# Patient Record
Sex: FEMALE | Race: WHITE | HISPANIC OR LATINO | Employment: FULL TIME | URBAN - METROPOLITAN AREA
[De-identification: names, ages, dates, MRNs, and addresses within clinical notes are randomized per-mention and may not be internally consistent; named-entity substitution may affect disease eponyms.]

---

## 2024-03-15 ENCOUNTER — WALK IN (OUTPATIENT)
Dept: URGENT CARE | Age: 34
End: 2024-03-15

## 2024-03-15 VITALS
DIASTOLIC BLOOD PRESSURE: 72 MMHG | TEMPERATURE: 98.9 F | OXYGEN SATURATION: 97 % | HEART RATE: 74 BPM | SYSTOLIC BLOOD PRESSURE: 111 MMHG | RESPIRATION RATE: 18 BRPM

## 2024-03-15 DIAGNOSIS — R09.82 PND (POST-NASAL DRIP): Primary | ICD-10-CM

## 2024-03-15 DIAGNOSIS — J02.9 SORE THROAT: ICD-10-CM

## 2024-03-15 LAB
S PYO DNA THROAT QL NAA+PROBE: NOT DETECTED
TEST LOT EXPIRATION DATE: NORMAL
TEST LOT NUMBER: NORMAL

## 2024-03-15 ASSESSMENT — ENCOUNTER SYMPTOMS
RHINORRHEA: 1
CONSTITUTIONAL NEGATIVE: 1
SORE THROAT: 1
NAUSEA: 1
PSYCHIATRIC NEGATIVE: 1
RESPIRATORY NEGATIVE: 1

## 2024-06-10 ENCOUNTER — APPOINTMENT (OUTPATIENT)
Dept: FAMILY MEDICINE | Age: 34
End: 2024-06-10

## 2024-07-11 ENCOUNTER — TELEPHONE (OUTPATIENT)
Dept: INTERNAL MEDICINE CLINIC | Facility: CLINIC | Age: 34
End: 2024-07-11

## 2024-07-11 ENCOUNTER — OFFICE VISIT (OUTPATIENT)
Dept: INTERNAL MEDICINE CLINIC | Facility: CLINIC | Age: 34
End: 2024-07-11
Payer: COMMERCIAL

## 2024-07-11 VITALS
HEIGHT: 62 IN | WEIGHT: 247.19 LBS | OXYGEN SATURATION: 98 % | SYSTOLIC BLOOD PRESSURE: 110 MMHG | BODY MASS INDEX: 45.49 KG/M2 | DIASTOLIC BLOOD PRESSURE: 76 MMHG | HEART RATE: 80 BPM

## 2024-07-11 DIAGNOSIS — E25.0 CONGENITAL ADRENAL CORTICAL HYPERPLASIA (HCC): ICD-10-CM

## 2024-07-11 DIAGNOSIS — E66.01 MORBID OBESITY (HCC): ICD-10-CM

## 2024-07-11 DIAGNOSIS — R63.5 ABNORMAL WEIGHT GAIN: ICD-10-CM

## 2024-07-11 DIAGNOSIS — E03.9 HYPOTHYROIDISM, UNSPECIFIED TYPE: Primary | ICD-10-CM

## 2024-07-11 PROBLEM — O20.9 VAGINAL BLEEDING IN PREGNANCY, FIRST TRIMESTER (HCC): Status: ACTIVE | Noted: 2022-08-31

## 2024-07-11 PROBLEM — M25.561 CHRONIC PAIN OF RIGHT KNEE: Status: ACTIVE | Noted: 2021-11-06

## 2024-07-11 PROBLEM — R91.8 MASS OF LEFT LUNG: Status: ACTIVE | Noted: 2021-04-28

## 2024-07-11 PROBLEM — O13.3 GESTATIONAL HYPERTENSION, THIRD TRIMESTER (HCC): Status: ACTIVE | Noted: 2023-12-31

## 2024-07-11 PROBLEM — O09.01 PREGNANCY WITH HISTORY OF INFERTILITY IN FIRST TRIMESTER (HCC): Status: ACTIVE | Noted: 2022-08-30

## 2024-07-11 PROBLEM — O30.049 DICHORIONIC DIAMNIOTIC TWIN PREGNANCY (HCC): Status: ACTIVE | Noted: 2022-08-31

## 2024-07-11 PROBLEM — G89.29 CHRONIC PAIN OF RIGHT KNEE: Status: ACTIVE | Noted: 2021-11-06

## 2024-07-11 PROCEDURE — 99204 OFFICE O/P NEW MOD 45 MIN: CPT | Performed by: INTERNAL MEDICINE

## 2024-07-11 RX ORDER — LEVOTHYROXINE SODIUM 112 UG/1
TABLET ORAL
COMMUNITY
Start: 2024-07-08

## 2024-07-11 RX ORDER — LEVOTHYROXINE SODIUM 88 UG/1
88 TABLET ORAL DAILY
COMMUNITY

## 2024-07-11 RX ORDER — TIRZEPATIDE 5 MG/.5ML
5 INJECTION, SOLUTION SUBCUTANEOUS WEEKLY
Qty: 2 ML | Refills: 0 | Status: SHIPPED | OUTPATIENT
Start: 2024-07-11 | End: 2024-08-02

## 2024-07-11 RX ORDER — TIRZEPATIDE 2.5 MG/.5ML
2.5 INJECTION, SOLUTION SUBCUTANEOUS WEEKLY
Qty: 2 ML | Refills: 0 | Status: SHIPPED | OUTPATIENT
Start: 2024-07-11 | End: 2024-08-02

## 2024-07-11 NOTE — PROGRESS NOTES
Honey Astorga is a 33 year old female.  Chief complaint:  weight loss management and obesity related comorbidities    HPI:     Honey Astorga is a 33 year old female new to our office today.  with PMH as listed below here for weight management     Weight history:    Wt Readings from Last 12 Encounters:   07/11/24 247 lb 3.2 oz (112.1 kg)   After she gave birth to her child started gaining weight   Has been up and down but not as much     Weight:  Max weight:247 lbs   Lowest weight:125 lbs     Triggers for weight gain? Food choices       Previous weight loss programs? YES:  Weight Watchers      Previous weight loss medications? No   Do you get up to eat at night? No    Typical diet   Breakfast: Lunch: Dinner: Snacks:   Coffee with almond milk   Yogurt or sausage egg with muffin  Sometimes skips lunch   Frozen pizza   Sandwiches  [place] and rice   Has a lot of rice with her dinner   Broccoli with rice   Tostada    Doesn't snack as much   Lightly salted ships       Sweet tooth: No  Soda or Juice: cut out soda   Has been a month     Was drinking regular soda   Before was 2 soda per day   Activity: yes orange theory 2ce per week   Sleep: 6-7 hours of sleep     Stress: 7/10-     Significant PMH/ or weight Related Co morbidities :   History of thyroid disease: Yes  History of thyroid nodule: No  Family history of thyroid cancer: No  History of pancreatitis : No  History of kidney stone: No   History of glaucoma: No  History of heart disease: No  Seizure: No          Knee is hurting   Tride knee brace   Limiting her exercise         Hypothyroidism   On levothyroxine       Current Outpatient Medications   Medication Sig Dispense Refill    levothyroxine 112 MCG Oral Tab       levothyroxine 88 MCG Oral Tab Take 1 tablet (88 mcg total) by mouth daily.         Past Medical History:    Congenital adrenal hyperplasia due to 21-hydroxylase deficiency (21-OH CAH), late onset (HCC)    Hypothyroidism     History reviewed. No  pertinent surgical history.    Patient Active Problem List   Diagnosis    Chronic pain of right knee    Congenital adrenal cortical hyperplasia (HCC)    Dichorionic diamniotic twin pregnancy (HCC)    Gestational hypertension, third trimester (HCC)    Mass of left lung     (normal spontaneous vaginal delivery) (HCC)    Pregnancy with history of infertility in first trimester (HCC)    Vaginal bleeding in pregnancy, first trimester (HCC)               REVIEW OF SYSTEMS:   A comprehensive 10 point review of systems was completed.  Pertinent positives and negatives noted in the HPI      PHYSICAL EXAM:   /76   Pulse 80   Ht 5' 2\" (1.575 m)   Wt 247 lb 3.2 oz (112.1 kg)   SpO2 98%   BMI 45.21 kg/m²   [unfilled]    Wt Readings from Last 6 Encounters:   24 247 lb 3.2 oz (112.1 kg)        GENERAL: well developed, well nourished,in no apparent distress  NECK: enlarged thyroid   LUNGS: clear to auscultation  CARDIO: RRR without murmur  NEURO: no gross deficits           Medications    levothyroxine 112 MCG Oral Tab    levothyroxine 88 MCG Oral Tab     No orders of the defined types were placed in this encounter.      ASSESSMENT/PLAN:       ICD-10-CM    1. Hypothyroidism, unspecified type  E03.9 levothyroxine 112 MCG Oral Tab     levothyroxine 88 MCG Oral Tab     Hemoglobin A1C (Glycohemoglobin) [E]     Lipid Panel [E]     TSH W Reflex To Free T4      2. Morbid obesity (HCC)  E66.01 levothyroxine 112 MCG Oral Tab     levothyroxine 88 MCG Oral Tab     Hemoglobin A1C (Glycohemoglobin) [E]     Lipid Panel [E]     TSH W Reflex To Free T4     CANCELED: Lipid Panel [E]     CANCELED: Hemoglobin A1C (Glycohemoglobin) [E]     CANCELED: TSH W Reflex To Free T4      3. Abnormal weight gain  R63.5 levothyroxine 112 MCG Oral Tab     levothyroxine 88 MCG Oral Tab     Hemoglobin A1C (Glycohemoglobin) [E]     Lipid Panel [E]     TSH W Reflex To Free T4     CANCELED: Lipid Panel [E]     CANCELED: Hemoglobin A1C  (Glycohemoglobin) [E]     CANCELED: TSH W Reflex To Free T4      4. Congenital adrenal cortical hyperplasia (HCC)  E25.0 levothyroxine 112 MCG Oral Tab     levothyroxine 88 MCG Oral Tab     Hemoglobin A1C (Glycohemoglobin) [E]     Lipid Panel [E]     TSH W Reflex To Free T4           Reviewed  Readiness for Lifestyle change: 10/10, Interest in Medication: 8/10, Surgery interest: 0/10.    Counseled on comprehensive weight loss plan including attention to nutrition, exercise and behavior/stress management for success.     Plan:  Advised the patient to follow low carb high protein diet   Advised to count carbs goal carbs is 50 g per day   Advised to increase protein goal is 90 g per day   Can add protein shakes   Increase water intake goal is 64 oz per day   Start probiotics   Increase exercise cardio and weight resistance training, discussed the importance of exercise for weight loss goal is 150 min per week   don't eat at late night   Given meal plan   Discussed resources for low carb meal   Discussed the importance of sleep and reducing stress for weight loss  Given low carb meal plan   Hba1c , lipid panel and tsh level   Reviewed and discussed blood work ordered by her pcp   Enlarged thyroid recommended us thyroid : she has an appt with endo and will discuss it with them at that time   Continue levothyroxine recheck thyroid level   Advised to check with insurance company if they cover wegovy       Discussed:  -low carb high protein  -Limit carbohydrates  -Read nutrition labels and keep a food log  -drink a lot of water 65 oz of water per day  - Do not drink your calories (no regular pop, juice, high calorie coffee drinks, limit alcohol)  - Do not eat late at night  - Exercise- at least 3 times per week ( goal is 150 min/week)  -dietician referral: No  -Labs as ordered: Yes        Follow up with PCP as scheduled.   Follow up with Alycia Valencia 1 mos    Please return to the clinic if you are having persistent or  worsening symptoms   Alycia Valencai MD,   Diplomate of the American Board of Internal Medicine  Diplomate of the American Board of Obesity Medicine

## 2024-07-11 NOTE — TELEPHONE ENCOUNTER
PRIOR AUTHORIZATION [employer]    Medication Name:   Tirzepatide-Weight Management (ZEPBOUND) 2.5 MG/0.5ML Subcutaneous Solution Auto-injector 2 mL 0 7/11/2024 8/2/2024   Sig:   Inject 2.5 mg into the skin once a week for 4 doses.     Route:   Subcutaneous     Order #:   [id number]           Indication per provider:    Morbid obesity (HCC) E66.01

## 2024-07-11 NOTE — PATIENT INSTRUCTIONS
CALVIN Amaro to [facility]. We are excited that you are committed to improving your health and have invited our practice to be part of your journey. Our approach to the medical management of weight loss is similar to that of other chronic diseases, like asthma or high blood pressure. Treatment is tailored to your needs and may look different than someone else in our program. Weight-loss success is dependent on many factors, including your motivation and commitment to better health.      We are committed to your medical safety, particularly when prescribing weight-loss medications. Because we are physicians, we measure your success not only by “pounds lost” - we will also track improvements in your laboratory work, vital signs and quality of life.      Weight loss and maintenance can be a challenging endeavor. We want to celebrate your successes and support you if you encounter difficult times. Please don’t hesitate to ask us questions and share with us any struggles you may have. For some patients, there may be many attempts at weight loss before lasting success is found, but we can help you find your success!      Sincerely,     Alycia Valencia MD  American Board of Obesity Medicine Diplomate  American Board of Internal Medicine Diplomate                                                                  Weight Loss Tips    Cut down on sugar and starches.    Ok to eat healthy fat ( avocado, nuts,eggs, olive oil and coconut oil)    Eat protein with each meal target 15-30 G of protein with each meal: Protein reduces appetite, cravings and hunger. It increases muscle mass and subsequently metabolism and fat burning.     If not able to meet your protein need, you can get a protein shake. Choose one with around 25 g of protein and low carb less than 5 g ( e.g: premier protein, orgain Clean Protein, whey protein muscle milk)    Limit carbohydrates between 50g-100g / day    Limit processed food, eat  unprocessed food whenever you can.    Read nutrition labels    Drink a lot of water  at least 65 oz of water per day: Water is a natural appetite suppressant, increases calorie burning and help you to loose weight.    Eat slowly.    Do not drink your calories ( avoid soft drinks, juice, high calorie coffee drinks, limit alcohol) Also stay away from artificially sweetened beverages too it can cause weight gain.    Think about challenges and write it down to address it next visit.     Do not eat late at night.      Exercise goal for weight loss is 150 minutes per week.    Take a probiotic everyday ( e.g: anguiano colon health brand, culturelle, VSL3, Lactobacillus Gasseri )     Use smart phone applications to track your progress/ carb intake e.g:( my fitnesspal, loose it, Carb Manager )    Have a good night sleep aim for 7-8 hours    Reduce your stress level.    Helpful websites: www.dietHunch.Fieldoo( search visual low-carb guides dietdoctor), or www.PollGround.Fieldoo.    Helpful exercise apps( [employer] zheng)   Helpful fasting apps :( Zero)         Foods to avoid :  Sugar: sweets, ice cream, fruit juices, candy and food that is high in added sugar.  Highly processed food  Refined grains: Rice, wheat, bread, cereal and pasta.  Starchy vegetables: Potatoes and corn     Low Carb food :  Meat: lamb, steak, chicken, turkey  Fish and sea food   Eggs  Plain yogurt   Cheese   Fat and oils   Fruits: berries are the best           How I Plan to Lose Weight      Goal setting is the “how” of weight loss. Motivators are the “why.” When setting goals, utilize the SMART technique:    SMART Technique Example   Specific Who, what, where, when, how… “I want to lose 10 pounds in two months.”   Measureable How will you track? 10 pounds in 8 weeks = 1.25 pounds/week   Attainable Resources you have available, previous experience “I have been able to do this before, and now I have new tools from my doctor!”   Relevant Why this goal is important  Review your motivators above   Timely Set benchmarks and deadlines “Focusing for two month intervals works for me.”         Even if your lose 0.5 pound per week You will still lose 26                       pounds by this time next year!

## 2024-07-12 LAB
CHOL/HDLC RATIO: 4.3 (CALC)
CHOLESTEROL, TOTAL: 164 MG/DL
HDL CHOLESTEROL: 38 MG/DL
HEMOGLOBIN A1C: 5.6 % OF TOTAL HGB
LDL-CHOLESTEROL: 101 MG/DL (CALC)
NON-HDL CHOLESTEROL: 126 MG/DL (CALC)
TRIGLYCERIDES: 145 MG/DL
TSH W/REFLEX TO FT4: 3.48 MIU/L

## 2024-07-23 ENCOUNTER — MED REC SCAN ONLY (OUTPATIENT)
Dept: INTERNAL MEDICINE CLINIC | Facility: CLINIC | Age: 34
End: 2024-07-23

## 2024-08-26 ENCOUNTER — PATIENT MESSAGE (OUTPATIENT)
Dept: INTERNAL MEDICINE CLINIC | Facility: CLINIC | Age: 34
End: 2024-08-26

## 2024-08-26 RX ORDER — TIRZEPATIDE 5 MG/.5ML
5 INJECTION, SOLUTION SUBCUTANEOUS WEEKLY
Qty: 6 ML | Refills: 0 | Status: SHIPPED | OUTPATIENT
Start: 2024-08-26

## 2024-08-27 NOTE — TELEPHONE ENCOUNTER
Reshma Mayfield, JUANITO 8/26/2024 12:13 PM CDT      ----- Message -----  From: Honey Astorga  Sent: 8/26/2024 12:11 PM CDT  To: [facility] Clinical Staff  Subject: Refill request     Hello!    I am on my last 5mg shot for zepbound. Looks like I will need a refill before my next appointment which is scheduled for September 28th.    Thank you,  Honey Astorga

## 2024-09-28 ENCOUNTER — TELEMEDICINE (OUTPATIENT)
Dept: INTERNAL MEDICINE CLINIC | Facility: CLINIC | Age: 34
End: 2024-09-28
Payer: COMMERCIAL

## 2024-09-28 DIAGNOSIS — E66.01 MORBID OBESITY (HCC): ICD-10-CM

## 2024-09-28 DIAGNOSIS — Z51.81 THERAPEUTIC DRUG MONITORING: Primary | ICD-10-CM

## 2024-09-28 DIAGNOSIS — E03.9 HYPOTHYROIDISM, UNSPECIFIED TYPE: ICD-10-CM

## 2024-09-28 PROBLEM — E28.2 PCOS (POLYCYSTIC OVARIAN SYNDROME): Status: ACTIVE | Noted: 2024-09-28

## 2024-09-28 PROBLEM — N93.9 ABNORMAL UTERINE BLEEDING: Status: ACTIVE | Noted: 2024-09-28

## 2024-09-28 PROCEDURE — 99214 OFFICE O/P EST MOD 30 MIN: CPT | Performed by: INTERNAL MEDICINE

## 2024-09-28 RX ORDER — TIRZEPATIDE 5 MG/.5ML
5 INJECTION, SOLUTION SUBCUTANEOUS WEEKLY
Qty: 6 ML | Refills: 0 | Status: SHIPPED | OUTPATIENT
Start: 2024-09-28 | End: 2024-12-15

## 2024-09-28 RX ORDER — LEVONORGESTREL AND ETHINYL ESTRADIOL 0.1-0.02MG
1 KIT ORAL DAILY
COMMUNITY

## 2024-09-28 RX ORDER — SWAB
1 SWAB, NON-MEDICATED MISCELLANEOUS DAILY
COMMUNITY

## 2024-09-28 NOTE — PATIENT INSTRUCTIONS
Build Muscle & Lose Fat  The great fat vs muscle diagram below paints a clear picture of why it’s so important for you to build muscle in order to lose fat.  Maybe you’ve wondered about muscle vs fat and why you need to build muscle to lose fat, look slim and keep the inches off.  Well, look no further! With the fat vs muscle diagram below you’ll see why healthy permanent weight loss requires you to build muscle to lose fat.  Fat vs Muscle Diagram  The facts are clear. The best way to lose fat and look slimmer is to build muscle. Since one picture’s worth a thousand words, here’s 5 lbs of muscle vs fat of the same weight. Notice 5 lbs of fat is three times bigger.    This means that if you were to build 5 lbs of muscle and lose 5 lbs of fat, you would weigh exactly the same, but look smaller and firmer.  So imagine if it were 25 lbs or 50 lbs of lost fat vs muscle gained.  This is why it’s possible for you to lose fat inches when exercising, yet show no change in scale weight. And can you see how firm the muscle looks compared to the lumpy, tapioca pudding consistency of fat?  Build Muscle to Lose Fat Benefits  Although daily physical activity, like walking, swimming and aerobics are essential to good heart health and weight management, combining muscle building weight training with cardiovascular exercise, gives you an unbeatable combination to lose fat and keep it off permanently.  Of course it takes a few weeks before you see any measurable changes. But you’ll start to build muscle, lose fat and burn more calories from the moment you begin weight training. Building muscle helps you:  1. Burn more calories. Unlike fat, muscle beefs up your metabolism to help you burn about 70% more calories than fat can.  2. Improve appearance. When done properly, strength training can greatly improve your posture and help to prevent joint pain.  3. Build confidence. Strong muscles and joints increase your level of confidence in  your abilities to perform many lifestyle activities.  4. Prevent injury. Strength training can build stronger muscles and more limber, flexible joints, which play a crucial role in preventing injury.  5. Increase bone density. Weight bearing activities improve your bone density and reduce bone loss. This helps to prevent osteoporosis.  Studies show that weight training combined with aerobics and stretching is the best way to build a strong, firm body and keep it that way.  So, if you want to look and feel better than ever, you need to build muscle to lose fat.     Taken from www.NsGene.Bettery by Henry Varela

## 2024-09-28 NOTE — PROGRESS NOTES
This visit was conducted using telemedicine with live interactive video and audio   Patient verbally consents to conduct video visit   Patient understands and accepts financial responsibility for any deductible, co-insurance and/or co-pays associated with this service.        Honey Astorga is a 33 year old female presents today for   CC: follow-up on medical weight loss program for therapeutic drug monitoring and obesity         HPI:   HONEY here for follow up on weight loss   Wt Readings from Last 12 Encounters:   07/11/24 247 lb 3.2 oz (112.1 kg)   Current weight 229 lbs   Starting weight: 247 lbs   Total weight loss: 18 lbs   Medication: zepbound 5 mg     Typical diet   Breakfast: Lunch: Dinner: Snacks:   Greek yogurt with the protein    Whatever is around   Salad    Whatever is around   Tosadas   Pasta   Protein pasta   Steak   Broccoli   Chicken        Doesn't snack as much   Nuts      She does have a protein shake instead of the meal     She was logging in the food     When she was tracking before   She was doing 70 g carb per day   Protein around 70 g protein     Soda/ juice/ alcohol: once in a while   When she goes out       Water intake: inadequate  Exercise: Yes: 2-3 times per week   Challenges: meal planning   She doesn't get enough protein     Side effect of medication: nausea on and off       Current Outpatient Medications   Medication Sig Dispense Refill    LUTERA 0.1-20 MG-MCG Oral Tab Take 1 tablet by mouth daily.      Prenatal 28-0.8 MG Oral Tab Take 1 tablet by mouth daily.      Tirzepatide-Weight Management (ZEPBOUND) 5 MG/0.5ML Subcutaneous Solution Auto-injector Inject 5 mg into the skin once a week. 6 mL 0    levothyroxine 112 MCG Oral Tab       levothyroxine 88 MCG Oral Tab Take 1 tablet (88 mcg total) by mouth daily.        Past Medical History:    Congenital adrenal hyperplasia due to 21-hydroxylase deficiency (21-OH CAH), late onset (HCC)    Hypothyroidism     No past surgical history on  file.     Social History:  Social History     Socioeconomic History    Marital status:    Tobacco Use    Smoking status: Never    Smokeless tobacco: Never   Substance and Sexual Activity    Alcohol use: Yes     Comment: OCC     Social Determinants of Health     Financial Resource Strain: Low Risk  (2023)    Received from [facility]    Overall Financial Resource Strain (CARDIA)     Difficulty of Paying Living Expenses: Not hard at all   Food Insecurity: No Food Insecurity (2023)    Received from [facility]    Hunger Vital Sign     Worried About Running Out of Food in the Last Year: Never true     Ran Out of Food in the Last Year: Never true   Transportation Needs: No Transportation Needs (2023)    Received from [facility]    PRAPARE - Transportation     Lack of Transportation (Medical): No     Lack of Transportation (Non-Medical): No   Housing Stability: Low Risk  (2023)    Received from [facility]    Housing Stability Vital Sign     Unable to Pay for Housing in the Last Year: No     Number of Places Lived in the Last Year: 1     Unstable Housing in the Last Year: No        Family History   Problem Relation Age of Onset    Heart Disorder Father     Diabetes Father     Lipids Mother      Patient Active Problem List   Diagnosis    Chronic pain of right knee    Congenital adrenal cortical hyperplasia (HCC)    Dichorionic diamniotic twin pregnancy (HCC)    Gestational hypertension, third trimester (HCC)    Mass of left lung     (normal spontaneous vaginal delivery) (HCC)    Pregnancy with history of infertility in first trimester (HCC)    Vaginal bleeding in pregnancy, first trimester (HCC)    Abnormal weight gain    Morbid obesity (HCC)    Hypothyroidism    PCOS (polycystic ovarian syndrome)    Abnormal uterine bleeding               REVIEW OF SYSTEMS:   A comprehensive 10 point review of  systems was completed.  Pertinent positives and negatives noted in the the HPI          PHYSICAL EXAM:       General: She is not in acute distress, normal appearance, not ill appearing  EYE: normal sclera, EOMI   Pulmonary/ chest:  Speaking in full sentences, no increased work of breathing  Psychiatric: Behavior is normal, normal affect  Skin: No visible lesions  Extremities: no obvious extremity swelling noted         Orders Placed This Encounter    LUTERA 0.1-20 MG-MCG Oral Tab     Sig: Take 1 tablet by mouth daily.    Prenatal 28-0.8 MG Oral Tab     Sig: Take 1 tablet by mouth daily.         ASSESSMENT/PLAN:       ICD-10-CM    1. Therapeutic drug monitoring  Z51.81 LUTERA 0.1-20 MG-MCG Oral Tab     Prenatal 28-0.8 MG Oral Tab     Tirzepatide-Weight Management (ZEPBOUND) 5 MG/0.5ML Subcutaneous Solution Auto-injector      2. Morbid obesity (HCC)  E66.01 LUTERA 0.1-20 MG-MCG Oral Tab     Prenatal 28-0.8 MG Oral Tab     Tirzepatide-Weight Management (ZEPBOUND) 5 MG/0.5ML Subcutaneous Solution Auto-injector      3. Hypothyroidism, unspecified type  E03.9 LUTERA 0.1-20 MG-MCG Oral Tab     Prenatal 28-0.8 MG Oral Tab     Tirzepatide-Weight Management (ZEPBOUND) 5 MG/0.5ML Subcutaneous Solution Auto-injector         Plan:  Patient has lost 18 lbs # since LOV  month ago. Patient has lost a total weight loss of 18 lbs # since first weight loss consult.  Labs reviewed  Advised to continue with low carb diet   Goal is [id number] g per day  Advised to read nutrition labels  Log in carbs if possible   Increase protein intake   exercise goal is 1 hour 3 times per week cardio and strength training   discussed challenges with weight loss   Weigh once a week at least   Avoid sugary drinks or artificially sweetened drinks  Water intake goal is 64 oz per day   Goal weight loss is 11 lbs in 3 months   Continue zepbound   Continue levothyroxine   Might need to adjust the dose of thyroid meds as she continues to loose the weight        Plan:  Nutrition: low carb diet   Referral RD/nutritionist : no  Behavior:  Motivational interviewing performed      Discussed strategies to overcome habits/challenges       Reviewed:  Nutrition and the importance of regular protein intake  Labs ordered:    no  Importance of physical activity and reducing sedentary time  Treatment plan       Please note that the following visit was completed using two-way, real-time interactive audio and video communication. This has been done in good alex to provide continuity of care in the best interest of the provider-patient relationship, due to the ongoing public health crises/ national emergency  due to COVID 19 and because of restrictions of visitation. Every conscious effort was taken to allow for sufficient and adequate time.         Included in this visit, time may have been spent reviewing labs, medications, radiology tests and decision making. Appropriate medical decision-making and tests are ordered as detailed in the plan of care above.  Coding/billing information is submitted for this visit based on complexity of care and/or time spent for the visit.      Alycia Valencia MD,   Diplomate of the American Board of Internal Medicine  Diplomate of the American Board of Obesity Medicine

## 2024-10-28 ENCOUNTER — PATIENT MESSAGE (OUTPATIENT)
Dept: INTERNAL MEDICINE CLINIC | Facility: CLINIC | Age: 34
End: 2024-10-28

## 2024-10-29 RX ORDER — TIRZEPATIDE 7.5 MG/.5ML
7.5 INJECTION, SOLUTION SUBCUTANEOUS WEEKLY
Qty: 6 ML | Refills: 0 | Status: SHIPPED | OUTPATIENT
Start: 2024-10-29

## 2024-11-20 ENCOUNTER — OFFICE VISIT (OUTPATIENT)
Dept: INTERNAL MEDICINE CLINIC | Facility: CLINIC | Age: 34
End: 2024-11-20
Payer: COMMERCIAL

## 2024-11-20 VITALS
BODY MASS INDEX: 41.22 KG/M2 | WEIGHT: 224 LBS | SYSTOLIC BLOOD PRESSURE: 110 MMHG | DIASTOLIC BLOOD PRESSURE: 70 MMHG | HEIGHT: 62 IN | HEART RATE: 84 BPM | OXYGEN SATURATION: 98 %

## 2024-11-20 DIAGNOSIS — Z51.81 THERAPEUTIC DRUG MONITORING: Primary | ICD-10-CM

## 2024-11-20 DIAGNOSIS — K21.9 GASTROESOPHAGEAL REFLUX DISEASE, UNSPECIFIED WHETHER ESOPHAGITIS PRESENT: ICD-10-CM

## 2024-11-20 DIAGNOSIS — E66.01 MORBID OBESITY (HCC): ICD-10-CM

## 2024-11-20 PROCEDURE — 99213 OFFICE O/P EST LOW 20 MIN: CPT | Performed by: INTERNAL MEDICINE

## 2024-11-20 NOTE — PATIENT INSTRUCTIONS
Build Muscle & Lose Fat  The great fat vs muscle diagram below paints a clear picture of why it’s so important for you to build muscle in order to lose fat.  Maybe you’ve wondered about muscle vs fat and why you need to build muscle to lose fat, look slim and keep the inches off.  Well, look no further! With the fat vs muscle diagram below you’ll see why healthy permanent weight loss requires you to build muscle to lose fat.  Fat vs Muscle Diagram  The facts are clear. The best way to lose fat and look slimmer is to build muscle. Since one picture’s worth a thousand words, here’s 5 lbs of muscle vs fat of the same weight. Notice 5 lbs of fat is three times bigger.    This means that if you were to build 5 lbs of muscle and lose 5 lbs of fat, you would weigh exactly the same, but look smaller and firmer.  So imagine if it were 25 lbs or 50 lbs of lost fat vs muscle gained.  This is why it’s possible for you to lose fat inches when exercising, yet show no change in scale weight. And can you see how firm the muscle looks compared to the lumpy, tapioca pudding consistency of fat?  Build Muscle to Lose Fat Benefits  Although daily physical activity, like walking, swimming and aerobics are essential to good heart health and weight management, combining muscle building weight training with cardiovascular exercise, gives you an unbeatable combination to lose fat and keep it off permanently.  Of course it takes a few weeks before you see any measurable changes. But you’ll start to build muscle, lose fat and burn more calories from the moment you begin weight training. Building muscle helps you:  1. Burn more calories. Unlike fat, muscle beefs up your metabolism to help you burn about 70% more calories than fat can.  2. Improve appearance. When done properly, strength training can greatly improve your posture and help to prevent joint pain.  3. Build confidence. Strong muscles and joints increase your level of confidence in  your abilities to perform many lifestyle activities.  4. Prevent injury. Strength training can build stronger muscles and more limber, flexible joints, which play a crucial role in preventing injury.  5. Increase bone density. Weight bearing activities improve your bone density and reduce bone loss. This helps to prevent osteoporosis.  Studies show that weight training combined with aerobics and stretching is the best way to build a strong, firm body and keep it that way.  So, if you want to look and feel better than ever, you need to build muscle to lose fat.     Taken from [email or web address].[employer]ily [email or web address] by Henry Varela

## 2024-11-20 NOTE — PROGRESS NOTES
Honey Astorga is a 33 year old female.    Chief complaint:weight loss follow up , medication refill, therapeutic drug monitoring    HPI:   HONEY here for follow up on weight loss   Wt Readings from Last 12 Encounters:   11/20/24 224 lb (101.6 kg)   07/11/24 247 lb 3.2 oz (112.1 kg)     Starting weight: 247 lbs   Total weight loss: 23  Medication: Yes: Zepbound     Typical diet   Breakfast: Lunch: Dinner: Snacks:   Yogurt   15 g protein   Eggs      Salads   Sometimes skips lunch  Steak   Chicken Tostada   Duff and rice    Nuts   Pickles      She does count carbs and protein   Less than 100 g per day carbs   Protein 70 g per day       Soda/ juice/ alcohol: Yes alcohol once in a while   Water intake: adequate  Exercise: Yes: 3 -4 times per week , walking pad 10,000 steps per day  Stepper     Challenges: hunger     Side effect of medication: stomach ache when she takes the shot   Score to self ( how well she is doing ):8/10             Current Outpatient Medications   Medication Sig Dispense Refill    LUTERA 0.1-20 MG-MCG Oral Tab Take 1 tablet by mouth daily.      Tirzepatide-Weight Management (ZEPBOUND) 5 MG/0.5ML Subcutaneous Solution Auto-injector Inject 5 mg into the skin once a week. 6 mL 0    levothyroxine 112 MCG Oral Tab       Tirzepatide-Weight Management (ZEPBOUND) 7.5 MG/0.5ML Subcutaneous Solution Auto-injector Inject 7.5 mg into the skin once a week. (Patient not taking: Reported on 11/20/2024) 6 mL 0    Prenatal 28-0.8 MG Oral Tab Take 1 tablet by mouth daily. (Patient not taking: Reported on 11/20/2024)      Tirzepatide-Weight Management (ZEPBOUND) 5 MG/0.5ML Subcutaneous Solution Auto-injector Inject 5 mg into the skin once a week for 12 doses. (Patient not taking: Reported on 11/20/2024) 6 mL 0    levothyroxine 88 MCG Oral Tab Take 1 tablet (88 mcg total) by mouth daily. (Patient not taking: Reported on 11/20/2024)        Past Medical History:    Congenital adrenal hyperplasia due to  21-hydroxylase deficiency (21-OH CAH), late onset (HCC)    Hypothyroidism     No past surgical history on file.     Social History:  Social History     Socioeconomic History    Marital status:    Tobacco Use    Smoking status: Never    Smokeless tobacco: Never   Substance and Sexual Activity    Alcohol use: Yes     Comment: OCC     Social Drivers of Health     Financial Resource Strain: Low Risk  (2023)    Received from [facility]    Overall Financial Resource Strain (CARDIA)     Difficulty of Paying Living Expenses: Not hard at all   Food Insecurity: No Food Insecurity (2023)    Received from [facility]    Hunger Vital Sign     Worried About Running Out of Food in the Last Year: Never true     Ran Out of Food in the Last Year: Never true   Transportation Needs: No Transportation Needs (2023)    Received from [facility]    PRAPARE - Transportation     Lack of Transportation (Medical): No     Lack of Transportation (Non-Medical): No   Housing Stability: Low Risk  (2023)    Received from [facility]    Housing Stability Vital Sign     Unable to Pay for Housing in the Last Year: No     Number of Places Lived in the Last Year: 1     Unstable Housing in the Last Year: No        Family History   Problem Relation Age of Onset    Heart Disorder Father     Diabetes Father     Lipids Mother      Patient Active Problem List   Diagnosis    Chronic pain of right knee    Congenital adrenal cortical hyperplasia (HCC)    Dichorionic diamniotic twin pregnancy ([facility])    Gestational hypertension, third trimester (HCC)    Mass of left lung     (normal spontaneous vaginal delivery) (HCC)    Pregnancy with history of infertility in first trimester (HCC)    Vaginal bleeding in pregnancy, first trimester ([facility])    Abnormal weight gain    Morbid obesity (HCC)    Hypothyroidism    PCOS (polycystic ovarian syndrome)     Abnormal uterine bleeding    Therapeutic drug monitoring               REVIEW OF SYSTEMS:   A comprehensive 10 point review of systems was completed.  Pertinent positives and negatives noted in the the HPI          PHYSICAL EXAM:   /70   Pulse 84   Ht 5' 2\" (1.575 m)   Wt 224 lb (101.6 kg)   SpO2 98%   BMI 40.97 kg/m²   GENERAL: well developed, well nourished,in no apparent distress  LUNGS: clear to auscultation  CARDIO: RRR without murmur  NEURO: no gross deficits              No orders of the defined types were placed in this encounter.        ASSESSMENT/PLAN:       ICD-10-CM    1. Therapeutic drug monitoring  Z51.81       2. Morbid obesity (HCC)  E66.01       3. Gastroesophageal reflux disease, unspecified whether esophagitis present  K21.9          Plan:  Patient has lost 23 lbs # since LOV. Patient has lost a total weight loss of 23 lbs # since first weight loss consult.  Labs reviewed  Advised to continue with low carb diet   Goal is less than 100 g per day  Advised to read nutrition labels  Log in carbs if possible   Increase protein intake   exercise goal is 1 hour 3 times per week cardio and strength training   discussed challenges with weight loss   Weigh once a week at least   Avoid sugary drinks or artificially sweetened drinks  Water intake goal is 64 oz per day   Goal weight loss is 11 lbs in 3 months   Continue zepbound 5 mg   Omeprazole for GERD and GI symptoms   If she continues to have GI symptoms or worsening consider stopping the medication         Plan:  Nutrition: low carb diet   Referral RD/nutritionist : no  Behavior:  Motivational interviewing performed      Discussed strategies to overcome habits/challenges       Reviewed:  Nutrition and the importance of regular protein intake  Labs ordered:    no  Treatment plan       I spent 20 minutes at the day of the service seeing the patient, examination, , completing charting and counseling the patient and/or on coordination of care.   The diagnosis, prognosis, and general treatment was explained to the patient.   Please return to the clinic if you are having persistent or worsening symptoms   Alycia Valencia MD,   Diplomate of the American Board of Internal Medicine  Diplomate of the American Board of Obesity Medicine

## 2024-12-05 ENCOUNTER — PATIENT MESSAGE (OUTPATIENT)
Dept: INTERNAL MEDICINE CLINIC | Facility: CLINIC | Age: 34
End: 2024-12-05

## 2024-12-05 DIAGNOSIS — Z51.81 THERAPEUTIC DRUG MONITORING: Primary | ICD-10-CM

## 2024-12-10 SDOH — ECONOMIC STABILITY: GENERAL: WOULD YOU LIKE HELP WITH ANY OF THE FOLLOWING NEEDS?: I DON'T WANT HELP WITH ANY OF THESE

## 2024-12-10 SDOH — ECONOMIC STABILITY: HOUSING INSECURITY: WHAT IS YOUR LIVING SITUATION TODAY?: I HAVE A STEADY PLACE TO LIVE

## 2024-12-10 SDOH — ECONOMIC STABILITY: FOOD INSECURITY: WITHIN THE PAST 12 MONTHS, THE FOOD YOU BOUGHT JUST DIDN'T LAST AND YOU DIDN'T HAVE MONEY TO GET MORE.: NEVER TRUE

## 2024-12-10 SDOH — ECONOMIC STABILITY: HOUSING INSECURITY: DO YOU HAVE PROBLEMS WITH ANY OF THE FOLLOWING?: NONE OF THE ABOVE

## 2024-12-10 SDOH — ECONOMIC STABILITY: TRANSPORTATION INSECURITY
IN THE PAST 12 MONTHS, HAS LACK OF RELIABLE TRANSPORTATION KEPT YOU FROM MEDICAL APPOINTMENTS, MEETINGS, WORK OR FROM GETTING THINGS NEEDED FOR DAILY LIVING?: NO

## 2024-12-10 ASSESSMENT — SOCIAL DETERMINANTS OF HEALTH (SDOH): IN THE PAST 12 MONTHS, HAS THE ELECTRIC, GAS, OIL, OR WATER COMPANY THREATENED TO SHUT OFF SERVICE IN YOUR HOME?: NO

## 2024-12-12 ENCOUNTER — APPOINTMENT (OUTPATIENT)
Dept: FAMILY MEDICINE | Age: 34
End: 2024-12-12

## 2024-12-12 VITALS
BODY MASS INDEX: 39.45 KG/M2 | RESPIRATION RATE: 18 BRPM | TEMPERATURE: 97.4 F | SYSTOLIC BLOOD PRESSURE: 116 MMHG | HEIGHT: 63 IN | HEART RATE: 75 BPM | OXYGEN SATURATION: 98 % | DIASTOLIC BLOOD PRESSURE: 76 MMHG | WEIGHT: 222.66 LBS

## 2024-12-12 DIAGNOSIS — R91.8 MASS OF LEFT LUNG: ICD-10-CM

## 2024-12-12 DIAGNOSIS — E25.0 CONGENITAL ADRENAL CORTICAL HYPERPLASIA  (CMD): ICD-10-CM

## 2024-12-12 DIAGNOSIS — Z23 NEED FOR VACCINATION: ICD-10-CM

## 2024-12-12 DIAGNOSIS — M25.561 CHRONIC PAIN OF RIGHT KNEE: ICD-10-CM

## 2024-12-12 DIAGNOSIS — G89.29 CHRONIC PAIN OF RIGHT KNEE: ICD-10-CM

## 2024-12-12 DIAGNOSIS — E03.9 HYPOTHYROIDISM, UNSPECIFIED TYPE: ICD-10-CM

## 2024-12-12 DIAGNOSIS — L98.9 SKIN LESIONS, GENERALIZED: ICD-10-CM

## 2024-12-12 DIAGNOSIS — Z00.00 HEALTHCARE MAINTENANCE: Primary | ICD-10-CM

## 2024-12-12 DIAGNOSIS — E25.9 CAH 21-OH (CONGENITAL ADRENAL HYPERPLASIA), LATE ONSET  (CMD): ICD-10-CM

## 2024-12-12 PROBLEM — O30.049 DICHORIONIC DIAMNIOTIC TWIN PREGNANCY (CMD): Status: RESOLVED | Noted: 2022-08-31 | Resolved: 2024-12-12

## 2024-12-12 PROBLEM — E28.2 PCOS (POLYCYSTIC OVARIAN SYNDROME): Status: ACTIVE | Noted: 2024-09-28

## 2024-12-12 PROBLEM — E66.9 OBESITY (BMI 30-39.9): Status: ACTIVE | Noted: 2024-12-12

## 2024-12-12 RX ORDER — LEVOTHYROXINE SODIUM 88 UG/1
88 TABLET ORAL DAILY
Qty: 90 TABLET | Refills: 0 | Status: SHIPPED | OUTPATIENT
Start: 2024-12-12

## 2024-12-12 RX ORDER — TIRZEPATIDE 5 MG/.5ML
1 INJECTION, SOLUTION SUBCUTANEOUS
COMMUNITY
Start: 2024-08-26 | End: 2024-12-12 | Stop reason: ALTCHOICE

## 2024-12-12 RX ORDER — LEVONORGESTREL AND ETHINYL ESTRADIOL 0.1-0.02MG
1 KIT ORAL DAILY
COMMUNITY

## 2024-12-12 ASSESSMENT — ENCOUNTER SYMPTOMS
CONSTIPATION: 0
DIZZINESS: 0
SHORTNESS OF BREATH: 0
SLEEP DISTURBANCE: 0
DIARRHEA: 0
HEADACHES: 0
LIGHT-HEADEDNESS: 0

## 2024-12-12 ASSESSMENT — PATIENT HEALTH QUESTIONNAIRE - PHQ9
CLINICAL INTERPRETATION OF PHQ2 SCORE: NO FURTHER SCREENING NEEDED
SUM OF ALL RESPONSES TO PHQ9 QUESTIONS 1 AND 2: 2
2. FEELING DOWN, DEPRESSED OR HOPELESS: SEVERAL DAYS
1. LITTLE INTEREST OR PLEASURE IN DOING THINGS: SEVERAL DAYS
SUM OF ALL RESPONSES TO PHQ9 QUESTIONS 1 AND 2: 2

## 2024-12-12 ASSESSMENT — PAIN SCALES - GENERAL: PAINLEVEL: 0

## 2024-12-13 ENCOUNTER — E-ADVICE (OUTPATIENT)
Dept: INTERNAL MEDICINE | Age: 34
End: 2024-12-13

## 2024-12-31 ENCOUNTER — EKG ENCOUNTER (OUTPATIENT)
Dept: LAB | Facility: HOSPITAL | Age: 34
End: 2024-12-31
Attending: INTERNAL MEDICINE
Payer: COMMERCIAL

## 2024-12-31 DIAGNOSIS — Z51.81 THERAPEUTIC DRUG MONITORING: ICD-10-CM

## 2024-12-31 LAB
ATRIAL RATE: 76 BPM
P AXIS: 34 DEGREES
P-R INTERVAL: 120 MS
Q-T INTERVAL: 370 MS
QRS DURATION: 78 MS
QTC CALCULATION (BEZET): 416 MS
R AXIS: 0 DEGREES
T AXIS: -6 DEGREES
VENTRICULAR RATE: 76 BPM

## 2024-12-31 PROCEDURE — 93010 ELECTROCARDIOGRAM REPORT: CPT | Performed by: STUDENT IN AN ORGANIZED HEALTH CARE EDUCATION/TRAINING PROGRAM

## 2024-12-31 PROCEDURE — 93005 ELECTROCARDIOGRAM TRACING: CPT

## 2025-01-07 ENCOUNTER — PATIENT MESSAGE (OUTPATIENT)
Dept: INTERNAL MEDICINE CLINIC | Facility: CLINIC | Age: 35
End: 2025-01-07

## 2025-01-07 DIAGNOSIS — E66.01 MORBID OBESITY (HCC): Primary | ICD-10-CM

## 2025-01-08 RX ORDER — PHENTERMINE HYDROCHLORIDE 15 MG/1
15 CAPSULE ORAL EVERY MORNING
Qty: 90 CAPSULE | Refills: 0 | Status: SHIPPED | OUTPATIENT
Start: 2025-01-08 | End: 2025-04-08

## 2025-01-26 ENCOUNTER — APPOINTMENT (OUTPATIENT)
Dept: URGENT CARE | Age: 35
End: 2025-01-26

## 2025-01-26 ENCOUNTER — TELEPHONE (OUTPATIENT)
Dept: URGENT CARE | Age: 35
End: 2025-01-26

## 2025-02-18 DIAGNOSIS — E66.01 MORBID OBESITY (HCC): ICD-10-CM

## 2025-02-18 RX ORDER — OMEPRAZOLE 20 MG/1
20 CAPSULE, DELAYED RELEASE ORAL
Qty: 90 CAPSULE | Refills: 0 | Status: SHIPPED | OUTPATIENT
Start: 2025-02-18

## 2025-03-11 ENCOUNTER — LAB SERVICES (OUTPATIENT)
Dept: LAB | Age: 35
End: 2025-03-11

## 2025-03-11 DIAGNOSIS — Z00.00 HEALTHCARE MAINTENANCE: ICD-10-CM

## 2025-03-11 DIAGNOSIS — E03.9 HYPOTHYROIDISM, UNSPECIFIED TYPE: ICD-10-CM

## 2025-03-11 LAB
ALBUMIN SERPL-MCNC: 3.7 G/DL (ref 3.4–5)
ALBUMIN/GLOB SERPL: 1.2 {RATIO} (ref 1–2.4)
ALP SERPL-CCNC: 96 UNITS/L (ref 45–117)
ALT SERPL-CCNC: 19 UNITS/L
ANION GAP SERPL CALC-SCNC: 11 MMOL/L (ref 7–19)
AST SERPL-CCNC: 14 UNITS/L
BILIRUB SERPL-MCNC: 0.4 MG/DL (ref 0.2–1)
BUN SERPL-MCNC: 9 MG/DL (ref 6–20)
BUN/CREAT SERPL: 11 (ref 7–25)
CALCIUM SERPL-MCNC: 9.2 MG/DL (ref 8.4–10.2)
CHLORIDE SERPL-SCNC: 107 MMOL/L (ref 97–110)
CO2 SERPL-SCNC: 25 MMOL/L (ref 21–32)
CREAT SERPL-MCNC: 0.85 MG/DL (ref 0.51–0.95)
EGFRCR SERPLBLD CKD-EPI 2021: >90 ML/MIN/{1.73_M2}
FASTING DURATION TIME PATIENT: 12 HOURS (ref 0–999)
GLOBULIN SER-MCNC: 3 G/DL (ref 2–4)
GLUCOSE SERPL-MCNC: 88 MG/DL (ref 70–99)
HBV SURFACE AB SER QL: POSITIVE
POTASSIUM SERPL-SCNC: 4.1 MMOL/L (ref 3.4–5.1)
PROT SERPL-MCNC: 6.7 G/DL (ref 6.4–8.2)
SODIUM SERPL-SCNC: 139 MMOL/L (ref 135–145)
TSH SERPL-ACNC: 2.61 MCUNITS/ML (ref 0.35–5)

## 2025-03-11 PROCEDURE — 36415 COLL VENOUS BLD VENIPUNCTURE: CPT | Performed by: FAMILY MEDICINE

## 2025-03-11 PROCEDURE — 80053 COMPREHEN METABOLIC PANEL: CPT | Performed by: CLINICAL MEDICAL LABORATORY

## 2025-03-11 PROCEDURE — 86706 HEP B SURFACE ANTIBODY: CPT | Performed by: CLINICAL MEDICAL LABORATORY

## 2025-03-11 PROCEDURE — 84443 ASSAY THYROID STIM HORMONE: CPT | Performed by: CLINICAL MEDICAL LABORATORY

## 2025-03-23 DIAGNOSIS — E03.9 HYPOTHYROIDISM, UNSPECIFIED TYPE: ICD-10-CM

## 2025-03-24 RX ORDER — LEVOTHYROXINE SODIUM 88 UG/1
88 TABLET ORAL DAILY
Qty: 90 TABLET | Refills: 0 | Status: SHIPPED | OUTPATIENT
Start: 2025-03-24

## 2025-04-14 ENCOUNTER — TELEMEDICINE (OUTPATIENT)
Dept: INTERNAL MEDICINE CLINIC | Facility: CLINIC | Age: 35
End: 2025-04-14
Payer: COMMERCIAL

## 2025-04-14 DIAGNOSIS — Z51.81 THERAPEUTIC DRUG MONITORING: Primary | ICD-10-CM

## 2025-04-14 DIAGNOSIS — E66.01 MORBID OBESITY (HCC): ICD-10-CM

## 2025-04-14 RX ORDER — PHENTERMINE HYDROCHLORIDE 30 MG/1
30 CAPSULE ORAL EVERY MORNING
Qty: 90 CAPSULE | Refills: 0 | Status: SHIPPED | OUTPATIENT
Start: 2025-04-14 | End: 2025-07-13

## 2025-04-14 NOTE — PATIENT INSTRUCTIONS
How to Stop Emotional Eating and Overeating   You have to know how to stop emotional eating and stop overeating if you want to lose weight and keep it off successfully.  Emotional eating and overeating can’t really satisfy an insatiable appetite anyway.  And whether or not you’ve been trying to use emotional eating to soothe feelings of stress, depression, loneliness, frustration or boredom, in the long run, overeating to feed those feelings only makes them worse.  But learning how to stop overeating and control emotional eating can support healthy permanent weight loss and make you feel powerful.  Stop Emotional Eating - Don’t Use Foods to Soothe Moods  You probably already know that overeating high-fat, high-calorie, sweet, salty and unhealthy bad carbs won’t fill that empty void inside for long.  But what can you do to learn how to stop emotional overeating?  Most of us learn emotional eating at a very young age. We get into the habit of using food to sooth stressful feelings, alleviate boredom, reward and comfort ourselves, boost our sprits and celebrate with others.  But even though almost everyone’s overeating, you don’t have to. If you’re ready to take that old-frenzied feed-your-feelings bull by the horns, here’s our basic 12 step program for how to stop emotional eating.  1. Make a commitment. Like any established bad habit, nothing will change unless you make a commit to changing your behavior.  2. Practice awareness. To be more conscious of what’s happening, jot down when and what you eat and how you feel before and afterwards.  3. Manage your stress. Healthy emotional distress management is an important life skill. Positive ways to reduce stress include regular exercise, relaxation techniques and getting support from family and friends.  4. Be physically active. Exercise reduces stress and is a great mood enhancer too. So be sure you make time for regular physical activity.  5. Create new comforts. Make  a list of healthy activities you enjoy. And, whenever you feel the need, treat yourself to something on your list.  6. Start eating healthier. When you eat for health you’ll choose more high fiber foods, such as vegetables, beans, whole grains and fresh fruits, plus healthy high protein foods, like fish, lean poultry and low-fat dairy.  7. Eat mini-meals often. By eating 5 or 6 small healthy meals a day, including breakfast, you help keep your blood sugar and moods stable.  8. Get rid of temptations. Don’t keep unhealthy food in the house, don’t shop for food when hungry or stressed and plan ahead before eating out.  9. Get enough sleep. When you’re tired, it’s easier to give in to emotional eating. Consider taking a nap and be sure to get a good nights sleep.  10. Use healthy distraction. Instead of overeating, take a walk, surf the Internet, pet your cat or dog, listen to music, enjoy a warm bath, read a good book, watch a movie, work in the garden or talk to a friend.  11. Practice mindfulness. Mindful eating means paying attention to the act of eating and observing your thoughts and feelings in the process.  12. Get some support. It’s easier to control emotional eating if you have a support network of friends or family. And if no one you know is supportive, make some new health-oriented friends or join a support group.  Learning how to stop emotional eating and overeating is a life-changing experience. Just make sure to stay on track and enjoy the journey.    Posted By Henry Varela On December 27, 2015 @ 11:33 am In Healthy Living. Taken from [email or web address]

## 2025-04-14 NOTE — PROGRESS NOTES
This visit was conducted using telemedicine with live interactive video and audio   Patient verbally consents to conduct video visit   Patient understands and accepts financial responsibility for any deductible, co-insurance and/or co-pays associated with this service.        Honey Astorga is a 34 year old female presents today for   CC: follow-up on medical weight loss program for therapeutic drug monitoring and obesity         HPI:   HONEY here for follow up on weight loss   Wt Readings from Last 12 Encounters:   11/20/24 224 lb (101.6 kg)   07/11/24 247 lb 3.2 oz (112.1 kg)   Current weight 218 lbs   Starting weight: 247 lbs   Total weight loss: 23 lbs   Medication: phentermine   Typical diet   Breakfast: Lunch: Dinner: Snacks:   Depends   Eggs   Avocado toast    Protein shake   Tuna salad  Different   Chicken   Chicken with Rice   Chicken with veggies  Beef   Steak at times   Pork chops   Brocolli       Nuts   Almonds   Protein bars   Yogurt          Was counting carbs and protein   She is staying under 100 g per day   Soda/ juice/ alcohol: No  Water intake: adequate  Exercise: mostly walking   Stepping, squats   Challenges: time to exercise     Side effect of medication: no           Current Medications[1]   Past Medical History[2]  Past Surgical History[3]     Social History:  Short Social Hx on File[4]   Family History[5]  Problem List[6]            REVIEW OF SYSTEMS:   A comprehensive 10 point review of systems was completed.  Pertinent positives and negatives noted in the the HPI          PHYSICAL EXAM:       General: She is not in acute distress, normal appearance, not ill appearing  EYE: normal sclera, EOMI   Pulmonary/ chest:  Speaking in full sentences, no increased work of breathing  Psychiatric: Behavior is normal, normal affect  Skin: No visible lesions  Extremities: no obvious extremity swelling noted         No orders of the defined types were placed in this encounter.        ASSESSMENT/PLAN:        ICD-10-CM    1. Therapeutic drug monitoring  Z51.81 Phentermine HCl 30 MG Oral Cap      2. Morbid obesity (HCC)  E66.01          Plan:  Patient has lost 6 lbs # since LOV  Patient has lost a total weight loss of 23 lbs # since first weight loss consult.   Advised to continue with low carb diet   Goal is less than 100 g per day   Advised to read nutrition labels  Log in carbs if possible   Increase protein intake   exercise goal is 1 hour 3 times per week cardio and strength training   discussed challenges with weight loss   Weigh once a week at least   Water intake goal is 64 oz per day   Weight loss is 11 pounds in 3 months  Advised to increase phentermine to 30 mg  Follow-up in 1 month     contraindication / History:    Plan:  Nutrition: low carb diet   Referral RD/nutritionist : no  Behavior:  Motivational interviewing performed      Discussed strategies to overcome habits/challenges       Reviewed:  Nutrition and the importance of regular protein intake  Labs ordered:    no  Importance of physical activity and reducing sedentary time  Treatment plan   Please note that the following visit was completed using two-way, real-time interactive audio and video communication. This has been done in good alex to provide continuity of care in the best interest of the provider-patient relationship, due to the ongoing public health crises/ national emergency  due to COVID 19 and because of restrictions of visitation. Every conscious effort was taken to allow for sufficient and adequate time.         Included in this visit, time may have been spent reviewing labs, medications, radiology tests and decision making. Appropriate medical decision-making and tests are ordered as detailed in the plan of care above.  Coding/billing information is submitted for this visit based on complexity of care and/or time spent for the visit.    I spent 20 minutes at the day of the service seeing the patient, examination, completing  charting and counseling the patient and/or on coordination of care.  The diagnosis, prognosis, and general treatment was explained to the patient.     Alycia Valencia MD,   Diplomate of the American Board of Internal Medicine  Diplomate of the American Board of Obesity Medicine            [1]   Current Outpatient Medications   Medication Sig Dispense Refill    OMEPRAZOLE 20 MG Oral Capsule Delayed Release TAKE 1 CAPSULE BY MOUTH EVERY DAY BEFORE BREAKFAST 90 capsule 0    Tirzepatide-Weight Management (ZEPBOUND) 5 MG/0.5ML Subcutaneous Solution Auto-injector Inject 1 tablet into the skin once a week. 6 mL 0    LUTERA 0.1-20 MG-MCG Oral Tab Take 1 tablet by mouth daily.      Prenatal 28-0.8 MG Oral Tab Take 1 tablet by mouth daily. (Patient not taking: Reported on 11/20/2024)      Tirzepatide-Weight Management (ZEPBOUND) 5 MG/0.5ML Subcutaneous Solution Auto-injector Inject 5 mg into the skin once a week. 6 mL 0    levothyroxine 112 MCG Oral Tab       levothyroxine 88 MCG Oral Tab Take 1 tablet (88 mcg total) by mouth daily. (Patient not taking: Reported on 11/20/2024)     [2]   Past Medical History:   Congenital adrenal hyperplasia due to 21-hydroxylase deficiency (21-OH CAH), late onset (HCC)    Hypothyroidism   [3] No past surgical history on file.  [4]   Social History  Socioeconomic History    Marital status:    Tobacco Use    Smoking status: Never    Smokeless tobacco: Never   Substance and Sexual Activity    Alcohol use: Yes     Comment: OCC     Social Drivers of Health     Food Insecurity: Low Risk  (12/10/2024)    Received from [facility]    Food Insecurity     Within the past 12 months, you worried that your food would run out before you got money to buy more.  : Never true     Within the past 12 months, the food you bought just didn't last and you didn't have money to get more. : Never true   Transportation Needs: Not At Risk (12/10/2024)    Received from [email or web address]     Transportation Needs     In the past 12 months, has lack of reliable transportation kept you from medical appointments, meetings, work or from getting things needed for daily living? : No   Housing Stability: Low Risk  (2023)    Received from [facility]    Housing Stability Vital Sign     Unable to Pay for Housing in the Last Year: No     Number of Places Lived in the Last Year: 1     Unstable Housing in the Last Year: No   [5]   Family History  Problem Relation Age of Onset    Heart Disorder Father     Diabetes Father     Lipids Mother    [6]   Patient Active Problem List  Diagnosis    Chronic pain of right knee    Congenital adrenal cortical hyperplasia (HCC)    Dichorionic diamniotic twin pregnancy (HCC)    Gestational hypertension, third trimester (HCC)    Mass of left lung     (normal spontaneous vaginal delivery) (HCC)    Pregnancy with history of infertility in first trimester (HCC)    Vaginal bleeding in pregnancy, first trimester (HCC)    Abnormal weight gain    Morbid obesity (HCC)    Hypothyroidism    PCOS (polycystic ovarian syndrome)    Abnormal uterine bleeding    Therapeutic drug monitoring

## 2025-05-20 ENCOUNTER — WALK IN (OUTPATIENT)
Dept: URGENT CARE | Age: 35
End: 2025-05-20

## 2025-05-20 ENCOUNTER — CLINICAL DOCUMENTATION (OUTPATIENT)
Dept: URGENT CARE | Age: 35
End: 2025-05-20

## 2025-05-20 VITALS
TEMPERATURE: 97.3 F | RESPIRATION RATE: 16 BRPM | DIASTOLIC BLOOD PRESSURE: 69 MMHG | HEART RATE: 85 BPM | SYSTOLIC BLOOD PRESSURE: 108 MMHG | OXYGEN SATURATION: 97 %

## 2025-05-20 DIAGNOSIS — R11.0 NAUSEA: ICD-10-CM

## 2025-05-20 DIAGNOSIS — M54.50 ACUTE BILATERAL LOW BACK PAIN WITHOUT SCIATICA: Primary | ICD-10-CM

## 2025-05-20 LAB
APPEARANCE, POC: CLEAR
B-HCG UR QL: NEGATIVE
BILIRUB UR QL STRIP: NEGATIVE
COLOR UR: YELLOW
GLUCOSE UR-MCNC: NEGATIVE MG/DL
HGB UR QL STRIP: NEGATIVE
INTERNAL PROCEDURAL CONTROLS ACCEPTABLE: YES
KETONES UR STRIP-MCNC: NEGATIVE MG/DL
LEUKOCYTE ESTERASE UR QL STRIP: ABNORMAL
NITRITE UR QL STRIP: NEGATIVE
PH UR: 6 [PH] (ref 5–7)
PROT UR-MCNC: NEGATIVE MG/DL
SP GR UR: 1.02 (ref 1–1.03)
TEST LOT EXPIRATION DATE: ABNORMAL
TEST LOT EXPIRATION DATE: NORMAL
TEST LOT NUMBER: ABNORMAL
TEST LOT NUMBER: NORMAL
UROBILINOGEN UR-MCNC: 0.2 MG/DL (ref 0–1)

## 2025-05-20 PROCEDURE — 81002 URINALYSIS NONAUTO W/O SCOPE: CPT

## 2025-05-20 PROCEDURE — 81025 URINE PREGNANCY TEST: CPT

## 2025-05-20 PROCEDURE — 96372 THER/PROPH/DIAG INJ SC/IM: CPT

## 2025-05-20 PROCEDURE — 87086 URINE CULTURE/COLONY COUNT: CPT | Performed by: CLINICAL MEDICAL LABORATORY

## 2025-05-20 PROCEDURE — 99214 OFFICE O/P EST MOD 30 MIN: CPT

## 2025-05-20 RX ORDER — KETOROLAC TROMETHAMINE 30 MG/ML
30 INJECTION, SOLUTION INTRAMUSCULAR; INTRAVENOUS ONCE
Status: COMPLETED | OUTPATIENT
Start: 2025-05-20 | End: 2025-05-20

## 2025-05-20 RX ORDER — CYCLOBENZAPRINE HCL 5 MG
5 TABLET ORAL 3 TIMES DAILY PRN
Qty: 20 TABLET | Refills: 0 | Status: SHIPPED | OUTPATIENT
Start: 2025-05-20

## 2025-05-20 RX ADMIN — KETOROLAC TROMETHAMINE 30 MG: 30 INJECTION, SOLUTION INTRAMUSCULAR; INTRAVENOUS at 08:59

## 2025-05-20 ASSESSMENT — ENCOUNTER SYMPTOMS
HEADACHES: 0
SHORTNESS OF BREATH: 0
NAUSEA: 0
VOMITING: 0
LIGHT-HEADEDNESS: 0
BACK PAIN: 1
DIZZINESS: 0
ABDOMINAL PAIN: 0
BRUISES/BLEEDS EASILY: 0

## 2025-05-20 ASSESSMENT — PAIN SCALES - GENERAL: PAINLEVEL_OUTOF10: 7

## 2025-05-21 LAB — BACTERIA UR CULT: NORMAL

## 2025-05-22 ENCOUNTER — RESULTS FOLLOW-UP (OUTPATIENT)
Dept: URGENT CARE | Age: 35
End: 2025-05-22

## 2025-05-23 ENCOUNTER — HOSPITAL ENCOUNTER (EMERGENCY)
Age: 35
Discharge: HOME | End: 2025-05-23
Attending: EMERGENCY MEDICINE

## 2025-05-23 VITALS
RESPIRATION RATE: 18 BRPM | WEIGHT: 215.83 LBS | DIASTOLIC BLOOD PRESSURE: 69 MMHG | HEART RATE: 88 BPM | BODY MASS INDEX: 37.82 KG/M2 | SYSTOLIC BLOOD PRESSURE: 110 MMHG | TEMPERATURE: 98.4 F | OXYGEN SATURATION: 99 %

## 2025-05-23 DIAGNOSIS — M54.50 ACUTE LOW BACK PAIN WITHOUT SCIATICA, UNSPECIFIED BACK PAIN LATERALITY: ICD-10-CM

## 2025-05-23 DIAGNOSIS — B37.31 CANDIDIASIS OF VULVA AND VAGINA: ICD-10-CM

## 2025-05-23 DIAGNOSIS — R10.9 RIGHT FLANK PAIN: Primary | ICD-10-CM

## 2025-05-23 LAB
APPEARANCE UR: ABNORMAL
BACTERIA #/AREA URNS HPF: ABNORMAL /HPF
BILIRUB UR QL STRIP: NEGATIVE
COLOR UR: YELLOW
GLUCOSE UR STRIP-MCNC: NEGATIVE MG/DL
HCG UR QL: NEGATIVE
HGB UR QL STRIP: ABNORMAL
HYALINE CASTS #/AREA URNS LPF: ABNORMAL /LPF
KETONES UR STRIP-MCNC: NEGATIVE MG/DL
LEUKOCYTE ESTERASE UR QL STRIP: ABNORMAL
MUCOUS THREADS URNS QL MICRO: PRESENT
NITRITE UR QL STRIP: NEGATIVE
PH UR STRIP: 6 [PH] (ref 5–7)
PROT UR STRIP-MCNC: NEGATIVE MG/DL
RBC #/AREA URNS HPF: ABNORMAL /HPF
SP GR UR STRIP: 1.01 (ref 1–1.03)
SQUAMOUS #/AREA URNS HPF: ABNORMAL /HPF
UROBILINOGEN UR STRIP-MCNC: 0.2 MG/DL
WBC #/AREA URNS HPF: ABNORMAL /HPF
YEAST URNS QL MICRO: PRESENT

## 2025-05-23 PROCEDURE — 10002803 HB RX 637: Performed by: EMERGENCY MEDICINE

## 2025-05-23 PROCEDURE — 87086 URINE CULTURE/COLONY COUNT: CPT | Performed by: EMERGENCY MEDICINE

## 2025-05-23 PROCEDURE — 96372 THER/PROPH/DIAG INJ SC/IM: CPT | Performed by: EMERGENCY MEDICINE

## 2025-05-23 PROCEDURE — 99284 EMERGENCY DEPT VISIT MOD MDM: CPT | Performed by: EMERGENCY MEDICINE

## 2025-05-23 PROCEDURE — 99283 EMERGENCY DEPT VISIT LOW MDM: CPT | Performed by: EMERGENCY MEDICINE

## 2025-05-23 PROCEDURE — 10002800 HB RX 250 W HCPCS: Performed by: EMERGENCY MEDICINE

## 2025-05-23 PROCEDURE — 81001 URINALYSIS AUTO W/SCOPE: CPT | Performed by: EMERGENCY MEDICINE

## 2025-05-23 PROCEDURE — 84703 CHORIONIC GONADOTROPIN ASSAY: CPT

## 2025-05-23 RX ORDER — KETOROLAC TROMETHAMINE 15 MG/ML
15 INJECTION, SOLUTION INTRAMUSCULAR; INTRAVENOUS ONCE
Status: COMPLETED | OUTPATIENT
Start: 2025-05-23 | End: 2025-05-23

## 2025-05-23 RX ORDER — FLUCONAZOLE 150 MG/1
150 TABLET ORAL ONCE
Qty: 1 TABLET | Refills: 0 | Status: SHIPPED | OUTPATIENT
Start: 2025-05-26 | End: 2025-05-26

## 2025-05-23 RX ADMIN — FLUCONAZOLE 150 MG: 50 TABLET ORAL at 08:47

## 2025-05-23 RX ADMIN — KETOROLAC TROMETHAMINE 15 MG: 15 INJECTION, SOLUTION INTRAMUSCULAR; INTRAVENOUS at 08:20

## 2025-05-23 ASSESSMENT — PAIN SCALES - GENERAL: PAINLEVEL_OUTOF10: 10

## 2025-05-23 ASSESSMENT — ENCOUNTER SYMPTOMS: BACK PAIN: 1

## 2025-05-24 LAB — BACTERIA UR CULT: ABNORMAL

## 2025-05-25 ENCOUNTER — RESULTS FOLLOW-UP (OUTPATIENT)
Dept: EMERGENCY MEDICINE | Age: 35
End: 2025-05-25

## 2025-06-24 ENCOUNTER — APPOINTMENT (OUTPATIENT)
Dept: ENDOCRINOLOGY | Age: 35
End: 2025-06-24